# Patient Record
(demographics unavailable — no encounter records)

---

## 2025-05-28 NOTE — HISTORY OF PRESENT ILLNESS
[FreeTextEntry1] : 67 yo man with known CAD since 5/2013 when he was admitted with an anterior wall STEMI, underwent primary PCI to the LAD with JESSE x 4. LVEF=58%. In July 2016 underwent PCI to the LCX and OM with JESSE x 2. Today at the clinic for routine follow up. Echo in 7/2019 was WNL.  He had LHC/RHC last 9/17/2020 which showed nonobstructive CAD and normal right heart pressures. In 2022 he stopped his aspirin and clopidogrel (!). Will restart today.  At the present time patient is completely asymptomatic and denies CP, SOB, orthopnea or PND. Denies palpitations, dizziness or ankle swelling.  HLD treated with medications Corneal dystrophy underwent corneal transplant in 2017 (LE) and 2023 (RE), LE. Will have another surgery LE 10/2025.  Never smoked. Occasional alcohol Denies the use of illicit drug

## 2025-05-28 NOTE — DISCUSSION/SUMMARY
[FreeTextEntry1] : Mr. AMANDA GUPTA is a 68 year male with known CAD and HLD. Had an anterior wall MI in 5/2013 and PCI to the LAD with multiple JESSE. Lost to FU for 1.5 years.  At the present time He is completely asymptomatic. I have recommended to continue the same medications Will request a new echo since the last one was almost 3 years ago. We will perform routine laboratory tests Routine follow up in 6 months  [EKG obtained to assist in diagnosis and management of assessed problem(s)] : EKG obtained to assist in diagnosis and management of assessed problem(s)

## 2025-05-28 NOTE — PHYSICAL EXAM
[Normal] : clear lung fields, good air entry, no respiratory distress [Soft] : abdomen soft [Non Tender] : non-tender [Normal Bowel Sounds] : normal bowel sounds [Normal Gait] : normal gait [No Edema] : no edema [No Cyanosis] : no cyanosis [No Clubbing] : no clubbing [No Varicosities] : no varicosities [No Rash] : no rash [Moves all extremities] : moves all extremities [No Focal Deficits] : no focal deficits [Normal Speech] : normal speech [Alert and Oriented] : alert and oriented [Normal memory] : normal memory [de-identified] : thin [de-identified] : Post RE surgery

## 2025-05-28 NOTE — REVIEW OF SYSTEMS
[SOB] : no shortness of breath [Dyspnea on exertion] : not dyspnea during exertion [Chest Discomfort] : no chest discomfort [Lower Ext Edema] : no extremity edema [Leg Claudication] : no intermittent leg claudication [Palpitations] : no palpitations [Orthopnea] : no orthopnea [PND] : no PND [Syncope] : no syncope [Cough] : no cough [Wheezing] : no wheezing [Coughing Up Blood] : no hemoptysis [Hematuria] : no hematuria [Muscle Cramps] : no muscle cramps [Myalgia] : no myalgia [Rash] : no rash [Dizziness] : no dizziness [Confusion] : no confusion was observed [Easy Bleeding] : no tendency for easy bleeding [Easy Bruising] : no tendency for easy bruising [Negative] : Heme/Lymph [FreeTextEntry3] : Post surgery.

## 2025-05-28 NOTE — ASSESSMENT
[FreeTextEntry1] : EKG performed today at the office revealed a NSR, with normal AQRS, IL, QRS and QTc. QS V1-3